# Patient Record
Sex: MALE | Race: WHITE | NOT HISPANIC OR LATINO
[De-identification: names, ages, dates, MRNs, and addresses within clinical notes are randomized per-mention and may not be internally consistent; named-entity substitution may affect disease eponyms.]

---

## 2019-09-20 ENCOUNTER — TRANSCRIPTION ENCOUNTER (OUTPATIENT)
Age: 69
End: 2019-09-20

## 2020-07-09 PROBLEM — Z00.00 ENCOUNTER FOR PREVENTIVE HEALTH EXAMINATION: Status: ACTIVE | Noted: 2020-07-09

## 2020-07-22 ENCOUNTER — APPOINTMENT (OUTPATIENT)
Dept: UROLOGY | Facility: CLINIC | Age: 70
End: 2020-07-22
Payer: MEDICARE

## 2020-07-22 ENCOUNTER — TRANSCRIPTION ENCOUNTER (OUTPATIENT)
Age: 70
End: 2020-07-22

## 2020-07-22 VITALS
TEMPERATURE: 97.8 F | HEIGHT: 70 IN | WEIGHT: 205 LBS | RESPIRATION RATE: 16 BRPM | HEART RATE: 74 BPM | SYSTOLIC BLOOD PRESSURE: 119 MMHG | BODY MASS INDEX: 29.35 KG/M2 | OXYGEN SATURATION: 97 % | DIASTOLIC BLOOD PRESSURE: 83 MMHG

## 2020-07-22 DIAGNOSIS — Z78.9 OTHER SPECIFIED HEALTH STATUS: ICD-10-CM

## 2020-07-22 DIAGNOSIS — Z80.9 FAMILY HISTORY OF MALIGNANT NEOPLASM, UNSPECIFIED: ICD-10-CM

## 2020-07-22 PROCEDURE — 99204 OFFICE O/P NEW MOD 45 MIN: CPT

## 2020-07-22 NOTE — ASSESSMENT
[FreeTextEntry1] : recommend T and PSA to see where he is at\par to get in AM tomorrow\par discussed other meds for osteopenia instead of T injections\par discussed Prosthesis for ED as T replacement not effective\par will refer to Colleague : Dr Lovelace who he was slated to see initially but due to his absence today , was seen by me.

## 2020-07-22 NOTE — HISTORY OF PRESENT ILLNESS
[FreeTextEntry1] : patient hx reviewed along with medical records supplied\par patient retired from [employer] and shortly thereafter at 63- was dx with pca \par states psa was 5+ and snow near 8 \par [place] [facility] [place] but told not good candidate so went for RRP (2016)  but as there was residual disease -- he had post op RT (2017) \par he states he never had ADT and after above no erections. however due to lack of enegy and a bone scan that showed OSTEOPENIA, he was begun on T replacement for low T, 2 years after above ( march 2019[place] [facility] [place]par after 3 injections with good rise in T and stable PSA- he elected to continue injections with Dr Currie at [facility] ( unable to give himself) and states at about 60 mg every Thursday is T is about 500.\par He is here to continue care due to an error he states was made by Dr Currie who continued to give him 80 mg with a T rise over 1000. he is due for an injetion this week .states his energy and muscle mass is much improved but still has no erections.\par denies any LUTS now after Dr Currie incised what appears to be a BNC due to slow flow/incomplete emptying in the past.,Now with AUA sx score of 3 ,  no hematuria or fever or N/V, no bowel issues

## 2020-07-22 NOTE — PHYSICAL EXAM
[General Appearance - Well Developed] : well developed [General Appearance - Well Nourished] : well nourished [General Appearance - In No Acute Distress] : no acute distress [Well Groomed] : well groomed [Normal Appearance] : normal appearance [Edema] : no peripheral edema [Respiration, Rhythm And Depth] : normal respiratory rhythm and effort [Abdomen Soft] : soft [Exaggerated Use Of Accessory Muscles For Inspiration] : no accessory muscle use [Abdomen Tenderness] : non-tender [Abdomen Mass (___ Cm)] : no abdominal mass palpated [Abdomen Hernia] : no hernia was discovered [Costovertebral Angle Tenderness] : no ~M costovertebral angle tenderness [Normal Station and Gait] : the gait and station were normal for the patient's age [] : no rash [No Focal Deficits] : no focal deficits [Oriented To Time, Place, And Person] : oriented to person, place, and time [Affect] : the affect was normal [Mood] : the mood was normal [Not Anxious] : not anxious [Cervical Lymph Nodes Enlarged Anterior Bilaterally] : anterior cervical [Cervical Lymph Nodes Enlarged Posterior Bilaterally] : posterior cervical [Supraclavicular Lymph Nodes Enlarged Bilaterally] : supraclavicular [FreeTextEntry1] : bladder not full

## 2020-07-22 NOTE — REVIEW OF SYSTEMS
[Feeling Tired] : feeling tired [Loss of interest] : loss of interest in sexual activity [Blood in urine that you can see] : blood visible in urine [Poor quality erections] : Poor quality erections [Told you have blood in urine on a urine test] : told blood was present in a urine test [Joint Pain] : joint pain [Wake up at night to urinate  How many times?  ___] : wakes up to urinate [unfilled] times during the night [Anxiety] : anxiety [Depression] : depression [Negative] : Heme/Lymph [see HPI] : see HPI [No erections] : no erections

## 2020-08-05 ENCOUNTER — APPOINTMENT (OUTPATIENT)
Dept: UROLOGY | Facility: CLINIC | Age: 70
End: 2020-08-05
Payer: MEDICARE

## 2020-08-05 VITALS
TEMPERATURE: 97.8 F | HEIGHT: 70 IN | OXYGEN SATURATION: 96 % | RESPIRATION RATE: 15 BRPM | DIASTOLIC BLOOD PRESSURE: 87 MMHG | BODY MASS INDEX: 29.35 KG/M2 | HEART RATE: 97 BPM | WEIGHT: 205 LBS | SYSTOLIC BLOOD PRESSURE: 167 MMHG

## 2020-08-05 DIAGNOSIS — Z86.39 PERSONAL HISTORY OF OTHER ENDOCRINE, NUTRITIONAL AND METABOLIC DISEASE: ICD-10-CM

## 2020-08-05 DIAGNOSIS — Z86.59 PERSONAL HISTORY OF OTHER MENTAL AND BEHAVIORAL DISORDERS: ICD-10-CM

## 2020-08-05 PROCEDURE — 99214 OFFICE O/P EST MOD 30 MIN: CPT

## 2020-08-05 RX ORDER — ROSUVASTATIN CALCIUM 20 MG/1
20 TABLET, FILM COATED ORAL
Refills: 0 | Status: ACTIVE | COMMUNITY

## 2020-08-05 NOTE — HISTORY OF PRESENT ILLNESS
[FreeTextEntry1] : He is a 69-year-old man who is seen today for prostate cancer, urethral stricture, low testosterone and erectile dysfunction. In 2016, he underwent robotic radical prostatectomy at [facility] [place] by [place]. Rosen. He was told that margins were positive and he underwent radiation in March 2017. He had PSA 5 and [place] 8 prostate cancer. He did not receive any androgen deprivation therapy. Bone density in 2018 showed osteopenia. In 2019, he was started on testosterone injection therapy. Patient says that his baseline testosterone was 190 and he was feeling tired and lethargic. Erections have not responded to oral medication therapy so far. In August 2019 he underwent dilation of tight meatal and fossa navicularis strictures. he is using 0.4-0.6 mL of testosterone 200 mg/mL injections once a week. In July 2020, testosterone was 353 and PSA 0.3. Residual urine today was minimal.

## 2020-08-05 NOTE — PHYSICAL EXAM
[General Appearance - Well Nourished] : well nourished [General Appearance - Well Developed] : well developed [Normal Appearance] : normal appearance [Well Groomed] : well groomed [General Appearance - In No Acute Distress] : no acute distress [Abdomen Tenderness] : non-tender [Costovertebral Angle Tenderness] : no ~M costovertebral angle tenderness [Abdomen Soft] : soft [Urethral Meatus] : meatus normal [Penis Abnormality] : normal circumcised penis [Epididymis] : the epididymides were normal [Testes Tenderness] : no tenderness of the testes [Urinary Bladder Findings] : the bladder was normal on palpation [Scrotum] : the scrotum was normal [Testes Mass (___cm)] : there were no testicular masses [No Prostate Nodules] : no prostate nodules [Respiration, Rhythm And Depth] : normal respiratory rhythm and effort [] : no respiratory distress [Prostate Absent] : was absent [Oriented To Time, Place, And Person] : oriented to person, place, and time [Exaggerated Use Of Accessory Muscles For Inspiration] : no accessory muscle use [Normal Station and Gait] : the gait and station were normal for the patient's age [Inguinal Lymph Nodes Enlarged Bilaterally] : inguinal

## 2020-08-05 NOTE — ASSESSMENT
[FreeTextEntry1] : The controversies regarding using testosterone replacement therapy in view of prostate cancer were reviewed. If he is to continue with testosterone injections, he should monitor PSA level at least every 4 months. He agrees. Urinary symptoms seem to be relatively stable at this time and there is no evidence of meatal stricture on physical examination. Residual urine volume is minimal. Other treatment options for erectile dysfunction such as injection therapy and penile prosthesis were discussed.

## 2021-01-31 LAB
PSA SERPL-MCNC: 0.58 NG/ML
TESTOST SERPL-MCNC: 626 NG/DL

## 2021-02-02 ENCOUNTER — TRANSCRIPTION ENCOUNTER (OUTPATIENT)
Age: 71
End: 2021-02-02

## 2021-02-09 ENCOUNTER — APPOINTMENT (OUTPATIENT)
Dept: UROLOGY | Facility: CLINIC | Age: 71
End: 2021-02-09
Payer: MEDICARE

## 2021-02-09 VITALS
WEIGHT: 205 LBS | DIASTOLIC BLOOD PRESSURE: 98 MMHG | SYSTOLIC BLOOD PRESSURE: 156 MMHG | BODY MASS INDEX: 29.35 KG/M2 | RESPIRATION RATE: 14 BRPM | HEIGHT: 70 IN | TEMPERATURE: 97.8 F | HEART RATE: 105 BPM | OXYGEN SATURATION: 95 %

## 2021-02-09 PROCEDURE — 99214 OFFICE O/P EST MOD 30 MIN: CPT

## 2021-02-09 PROCEDURE — 99072 ADDL SUPL MATRL&STAF TM PHE: CPT

## 2021-02-09 NOTE — PHYSICAL EXAM
[General Appearance - Well Developed] : well developed [General Appearance - Well Nourished] : well nourished [Normal Appearance] : normal appearance [Well Groomed] : well groomed [General Appearance - In No Acute Distress] : no acute distress [Abdomen Soft] : soft [Abdomen Tenderness] : non-tender [Costovertebral Angle Tenderness] : no ~M costovertebral angle tenderness [Urethral Meatus] : meatus normal [Penis Abnormality] : normal circumcised penis [Urinary Bladder Findings] : the bladder was normal on palpation [Scrotum] : the scrotum was normal [Epididymis] : the epididymides were normal [Testes Tenderness] : no tenderness of the testes [Testes Mass (___cm)] : there were no testicular masses [Prostate Absent] : was absent [FreeTextEntry1] : MASON done 2/2021.  [] : no respiratory distress [Respiration, Rhythm And Depth] : normal respiratory rhythm and effort [Exaggerated Use Of Accessory Muscles For Inspiration] : no accessory muscle use [Inguinal Lymph Nodes Enlarged Bilaterally] : inguinal

## 2021-02-09 NOTE — ASSESSMENT
[FreeTextEntry1] : Low testosterone: Testosterone level has responded appropriately with injections as indicated above.  He will continue for now.  Prostate cancer: Increase in PSA level was noted compared to last July.  We will continue to monitor in a few months and then we will decide on continuation of testosterone therapy.  Erectile dysfunction: He is responding to oral medication therapy for erectile dysfunction at this time.  He can follow-up in about 6 months. \par \par  Today´s visit included review of previous radiologic and laboratory tests and patient´s history, examination and evaluation, counseling and education of patient (family or caregiver if present), ordering medication, laboratory and radiological studies, documentation of information in medical records and independently interpreting results and care coordination, if required. Total time spent was 30 minutes.

## 2021-02-09 NOTE — HISTORY OF PRESENT ILLNESS
[FreeTextEntry1] : He is a 70-year-old man who is seen today for prostate cancer, urethral stricture, low testosterone and erectile dysfunction.  Nocturia is twice.  He has minimal or no stress incontinence.  Also he is responding to Viagra, Cialis or Stendra for erectile dysfunction.  He is injecting testosterone on a weekly basis 0.6 cc of 200 mg/mL.  It improves his energy and erections.  In January 2021, PSA level was 0.58 and testosterone was 626.\par Previous notes: In 2016, he underwent robotic radical prostate[place] at [facility] [place] Rosen. He was told that margins were positive and he underwent radiation in March 2017. He had PSA 5 and Neelima 8 prostate cancer. He did not receive any androgen deprivation therapy. Bone density in 2018 showed osteopenia. In 2019, he was started on testosterone injection therapy. Patient says that his baseline testosterone was 190 and he was feeling tired and lethargic. In August 2019 he underwent dilation of tight meatal and fossa navicularis strictures. he is using 0.4-0.6 mL of testosterone 200 mg/mL injections once a week. In July 2020, testosterone was 353 and PSA 0.3. Residual urine was minimal.

## 2021-02-25 ENCOUNTER — TRANSCRIPTION ENCOUNTER (OUTPATIENT)
Age: 71
End: 2021-02-25

## 2021-03-01 ENCOUNTER — TRANSCRIPTION ENCOUNTER (OUTPATIENT)
Age: 71
End: 2021-03-01

## 2021-03-16 ENCOUNTER — TRANSCRIPTION ENCOUNTER (OUTPATIENT)
Age: 71
End: 2021-03-16

## 2021-08-16 ENCOUNTER — APPOINTMENT (OUTPATIENT)
Dept: UROLOGY | Facility: CLINIC | Age: 71
End: 2021-08-16
Payer: MEDICARE

## 2021-08-16 VITALS
TEMPERATURE: 97.8 F | HEIGHT: 70 IN | RESPIRATION RATE: 15 BRPM | SYSTOLIC BLOOD PRESSURE: 157 MMHG | WEIGHT: 205 LBS | HEART RATE: 90 BPM | BODY MASS INDEX: 29.35 KG/M2 | DIASTOLIC BLOOD PRESSURE: 84 MMHG | OXYGEN SATURATION: 96 %

## 2021-08-16 LAB
PSA SERPL-MCNC: 0.9 NG/ML
TESTOST SERPL-MCNC: 1007 NG/DL

## 2021-08-16 PROCEDURE — 99214 OFFICE O/P EST MOD 30 MIN: CPT

## 2021-08-16 NOTE — PHYSICAL EXAM
[General Appearance - Well Developed] : well developed [General Appearance - Well Nourished] : well nourished [Normal Appearance] : normal appearance [Well Groomed] : well groomed [General Appearance - In No Acute Distress] : no acute distress [Abdomen Soft] : soft [Abdomen Tenderness] : non-tender [Costovertebral Angle Tenderness] : no ~M costovertebral angle tenderness [Urethral Meatus] : meatus normal [Penis Abnormality] : normal circumcised penis [Urinary Bladder Findings] : the bladder was normal on palpation [Scrotum] : the scrotum was normal [Epididymis] : the epididymides were normal [Testes Tenderness] : no tenderness of the testes [Testes Mass (___cm)] : there were no testicular masses [Prostate Absent] : was absent [FreeTextEntry1] : MASON done 2/2021.  [] : no respiratory distress [Respiration, Rhythm And Depth] : normal respiratory rhythm and effort [Exaggerated Use Of Accessory Muscles For Inspiration] : no accessory muscle use

## 2021-08-16 NOTE — HISTORY OF PRESENT ILLNESS
[FreeTextEntry1] : He is a 70-year-old man who is seen today for prostate cancer, urethral stricture, low testosterone and erectile dysfunction.  Nocturia is once or twice.  He has tried several oral medication therapies and quality of erections are not satisfactory unless he uses a vacuum erection device.  In August 2021, testosterone level was 1007 and the PSA level increased to 0.9.  There is no weight loss or bone pain.  He does not have stress incontinence.\par \par Previous notes: He is responding to Viagra, Cialis or Stendra for erectile dysfunction.  He is injecting testosterone on a weekly basis 0.6 cc of 200 mg/mL.  It improves his energy and erections.  In January 2021, PSA level was 0.58 and testosterone was 626.\par \par Previous urologic history: In 2016, he underwent robotic radical prostate[place] at [facility] [place] Rosen. He was told that margins were positive and he underwent radiation in March 2017. He had PSA 5 and Neelima 8 prostate cancer. He did not receive any androgen deprivation therapy. Bone density in 2018 showed osteopenia. In 2019, he was started on testosterone injection therapy. Patient says that his baseline testosterone was 190 and he was feeling tired and lethargic. In August 2019 he underwent dilation of tight meatal and fossa navicularis strictures. he is using 0.4-0.6 mL of testosterone 200 mg/mL injections once a week. In July 2020, testosterone was 353 and PSA 0.3. Residual urine was minimal.

## 2021-08-16 NOTE — ASSESSMENT
[FreeTextEntry1] : Increase in PSA level was discussed.  Potential for recurrence of prostate cancer with PSA activity was discussed.  He is very concerned about coming off of testosterone replacement therapy.  At least he agrees for now to decrease testosterone injection to 40 mg once a week and have a PSA and testosterone level repeated in 3 months.  Risk of metastasis was discussed.  If PSA level continues to increase, then may consider Axumin PET scan for metastatic work-up.

## 2021-08-17 ENCOUNTER — TRANSCRIPTION ENCOUNTER (OUTPATIENT)
Age: 71
End: 2021-08-17

## 2021-08-19 ENCOUNTER — TRANSCRIPTION ENCOUNTER (OUTPATIENT)
Age: 71
End: 2021-08-19

## 2021-08-22 ENCOUNTER — TRANSCRIPTION ENCOUNTER (OUTPATIENT)
Age: 71
End: 2021-08-22

## 2021-08-27 ENCOUNTER — APPOINTMENT (OUTPATIENT)
Dept: UROLOGY | Facility: CLINIC | Age: 71
End: 2021-08-27

## 2021-09-28 ENCOUNTER — APPOINTMENT (OUTPATIENT)
Dept: UROLOGY | Facility: CLINIC | Age: 71
End: 2021-09-28

## 2021-10-12 ENCOUNTER — APPOINTMENT (OUTPATIENT)
Dept: UROLOGY | Facility: CLINIC | Age: 71
End: 2021-10-12

## 2021-11-10 LAB — PSA SERPL-MCNC: 0.62 NG/ML

## 2021-11-11 LAB — TESTOST SERPL-MCNC: 370 NG/DL

## 2021-11-19 ENCOUNTER — APPOINTMENT (OUTPATIENT)
Dept: UROLOGY | Facility: CLINIC | Age: 71
End: 2021-11-19

## 2021-11-19 VITALS
DIASTOLIC BLOOD PRESSURE: 96 MMHG | BODY MASS INDEX: 28.35 KG/M2 | SYSTOLIC BLOOD PRESSURE: 147 MMHG | HEART RATE: 93 BPM | OXYGEN SATURATION: 98 % | WEIGHT: 198 LBS | TEMPERATURE: 97.1 F | HEIGHT: 70 IN

## 2021-11-22 ENCOUNTER — TRANSCRIPTION ENCOUNTER (OUTPATIENT)
Age: 71
End: 2021-11-22

## 2021-11-26 ENCOUNTER — TRANSCRIPTION ENCOUNTER (OUTPATIENT)
Age: 71
End: 2021-11-26

## 2021-12-03 ENCOUNTER — LABORATORY RESULT (OUTPATIENT)
Age: 71
End: 2021-12-03

## 2021-12-03 ENCOUNTER — APPOINTMENT (OUTPATIENT)
Dept: UROLOGY | Facility: CLINIC | Age: 71
End: 2021-12-03
Payer: MEDICARE

## 2021-12-03 VITALS — HEART RATE: 71 BPM | TEMPERATURE: 97.3 F | DIASTOLIC BLOOD PRESSURE: 85 MMHG | SYSTOLIC BLOOD PRESSURE: 140 MMHG

## 2021-12-03 PROCEDURE — 99214 OFFICE O/P EST MOD 30 MIN: CPT

## 2021-12-03 NOTE — HISTORY OF PRESENT ILLNESS
[FreeTextEntry1] : 71 year [place] \par  x 34 years\par one son\par diagnosed with prostate cancer 2015\par RALP Dr Deon Tillman\par postoperative radiation therapy\par currently complaining of erectile dysfunction\par currently being treated by Dr Tee Bustillos at [facility] [place]\par patient was accused of inappropriate behaviour and dismissed patient from practice\par Patient was seen by Dr Lovelace and wants to change care provider.\par He is unhappy regarding swings in testosterone levels\par Currently he is taking testosterone cypionate 40 mg IM weekly and tadalafil 20 mg

## 2021-12-03 NOTE — ASSESSMENT
[FreeTextEntry1] : Mr. Brayden Barahona is a 71-year-old retired [employer] [place] [employer] worker.  He is .  He has 1 child.  He has a past history of having prostate cancer.  This was reported as a Neelima's 8 cancer with positive margins.  Surgery was carried out by Dr. Deon Rosen.  He subsequently underwent radiation therapy.  He has been treated by Dr. Tee Bustillos for erectile dysfunction and hypogonadism.  He has been placed on testosterone therapy.  Dr. Bustillos dismissed the patient from his practice because of reported "inappropriate behavior".  The patient denies this behavior.\par \par He has been maintained on testosterone therapy by Dr. Ramirez.\par He now presents with concern regarding his erectile function.  However patient reports that he gets an excellent erection with tadalafil or sildenafil.  He previously underwent penile duplex ultrasound evaluation by Dr. Bustillos this result is not available however he did get an excellent erection from that.  Additionally he does respond to oral medications.  He is concerned about his blood flow.  We discussed the fact that the fact that he responds to the oral medications indicates that he does have normal blood flow to the penis.  His erectile dysfunction is clearly due to the effects of the surgery and or radiation on the neurotransmitter necessary for erections.  The BRAYDEN BARAHONA  expressed fully understanding of the information provided, the consequences and the management.\par Discussed PD5-I possible side effects, onset of action, duration of action, and food interactions.  Discussed behavioral strategies to optimize efficacy of medication.  \par DIscussed the potential advantages and disadvantages as well as side effect profiles of each.\par Warned re priapism and need for intervention to prevent permanent penile injury,\par \par \par \par We discussed the fact that testosterone therapy after prostate cancer is controversial. \par Recenlty 40 mg IM q week (last shot)  We discussed the fact that he does have an elevated PSA.  This is suggestive of recurrence of the disease.  It is my recommendation that he stop taking the testosterone therapy at this point.  Additionally I think he needs to undergo a PSA and a scan to determine evidence of recurrent disease as well as location and possible therapy required.\par The BRAYDEN BARAHONA  expressed fully understanding of the information provided, the consequences and the management.\par We discussed potential side effects of T cessation\par \par Plan:\par 1. T level ( last injection 3 days ago)\par 2. PSA\par 3 PSMA scan\par 4. tadalafil 20 mg\par 4. followup\par

## 2021-12-03 NOTE — PHYSICAL EXAM
[Abdomen Soft] : soft [Abdomen Tenderness] : non-tender [Costovertebral Angle Tenderness] : no ~M costovertebral angle tenderness [Urethral Meatus] : meatus normal [Urinary Bladder Findings] : the bladder was normal on palpation [Scrotum] : the scrotum was normal [Testes Mass (___cm)] : there were no testicular masses [FreeTextEntry1] : 10 cm; absent prostate ; irregular fossa

## 2021-12-05 ENCOUNTER — TRANSCRIPTION ENCOUNTER (OUTPATIENT)
Age: 71
End: 2021-12-05

## 2021-12-29 ENCOUNTER — NON-APPOINTMENT (OUTPATIENT)
Age: 71
End: 2021-12-29

## 2022-01-04 ENCOUNTER — NON-APPOINTMENT (OUTPATIENT)
Age: 72
End: 2022-01-04

## 2022-02-02 ENCOUNTER — APPOINTMENT (OUTPATIENT)
Dept: UROLOGY | Facility: CLINIC | Age: 72
End: 2022-02-02

## 2022-02-08 ENCOUNTER — APPOINTMENT (OUTPATIENT)
Dept: NUCLEAR MEDICINE | Facility: IMAGING CENTER | Age: 72
End: 2022-02-08
Payer: COMMERCIAL

## 2022-02-08 ENCOUNTER — OUTPATIENT (OUTPATIENT)
Dept: OUTPATIENT SERVICES | Facility: HOSPITAL | Age: 72
LOS: 1 days | End: 2022-02-08
Payer: COMMERCIAL

## 2022-02-08 DIAGNOSIS — C61 MALIGNANT NEOPLASM OF PROSTATE: ICD-10-CM

## 2022-02-08 PROCEDURE — 78816 PET IMAGE W/CT FULL BODY: CPT | Mod: 26

## 2022-02-08 PROCEDURE — 78816 PET IMAGE W/CT FULL BODY: CPT

## 2022-02-08 PROCEDURE — A9595: CPT

## 2022-02-15 ENCOUNTER — NON-APPOINTMENT (OUTPATIENT)
Age: 72
End: 2022-02-15

## 2022-03-08 ENCOUNTER — APPOINTMENT (OUTPATIENT)
Dept: UROLOGY | Facility: CLINIC | Age: 72
End: 2022-03-08
Payer: MEDICARE

## 2022-03-08 VITALS
RESPIRATION RATE: 17 BRPM | HEART RATE: 90 BPM | SYSTOLIC BLOOD PRESSURE: 148 MMHG | TEMPERATURE: 97.6 F | HEIGHT: 70 IN | DIASTOLIC BLOOD PRESSURE: 76 MMHG | BODY MASS INDEX: 28.35 KG/M2 | WEIGHT: 198 LBS

## 2022-03-08 PROCEDURE — 99215 OFFICE O/P EST HI 40 MIN: CPT

## 2022-03-08 RX ORDER — CEPHALEXIN 500 MG/1
500 CAPSULE ORAL
Qty: 14 | Refills: 0 | Status: COMPLETED | COMMUNITY
Start: 2021-12-30

## 2022-03-08 NOTE — ASSESSMENT
[FreeTextEntry1] : Rising PSA post prostate cancer treatment: Repeat PSA and serum testosterone today.\par Hypergonadism: We will have repeat serum testosterone today. Discussed the controversy regarding testosterone supplementation in patients who have potentially active prostate cancer. We will hold off on testosterone supplementation at this time.\par \par Bladder neck contracture: Urinary function appears to be stable. Urinalysis and cytology were sent today.\par \par We will call with results and follow-up plan.

## 2022-03-08 NOTE — HISTORY OF PRESENT ILLNESS
[FreeTextEntry1] : Referred for further evaluation regarding rising PSA.\par Patient has a prior history of prostate cancer diagnosed in 2015. He underwent a robotic assisted laparoscopic radical prostatectomy. By the patient report he was noted to have Neelima 8 prostate cancer. Also by the patient report he did have positive lymph node invasion. His PSA post prostatectomy was detectable and he eventually underwent salvage radiation therapy. This was not accompanied by ADT.\par \par At some time following his diagnosis, he was also started on testosterone supplementation for low testosterone and erectile dysfunction.\par \par His PSA in January 2021 was 0.58 ng/mL. There was a subsequent rise to 0.9 ng/mL in August 2021. The most recent PSA was 0.62 in December 2021. He underwent a PSMA PET/CT scan February 2022: No evidence of recurrent or metastatic disease.\par \par Following his rising PSA, his testosterone supplementation was discontinued in December. He reports that he continues to have normal energy and stamina. He is able to achieve full erections with the use of tadalafil 20 mg.\par \par From a urinary standpoint he does report reasonable urinary flow and no significant stress incontinence. At the end of December he did have an episode of difficulty with urination. He was seen in outside hospital and underwent a urethral dilation for presumed bladder neck contracture. Following the dilation his urinary function is improved and has remained stable.\par \par

## 2022-03-09 ENCOUNTER — NON-APPOINTMENT (OUTPATIENT)
Age: 72
End: 2022-03-09

## 2022-03-10 VITALS — SYSTOLIC BLOOD PRESSURE: 110 MMHG | HEART RATE: 88 BPM | DIASTOLIC BLOOD PRESSURE: 70 MMHG | RESPIRATION RATE: 18 BRPM

## 2022-03-11 ENCOUNTER — NON-APPOINTMENT (OUTPATIENT)
Age: 72
End: 2022-03-11

## 2022-03-11 ENCOUNTER — APPOINTMENT (OUTPATIENT)
Dept: UROLOGY | Facility: CLINIC | Age: 72
End: 2022-03-11

## 2022-03-15 DIAGNOSIS — C61 MALIGNANT NEOPLASM OF PROSTATE: ICD-10-CM

## 2022-03-15 LAB
APPEARANCE: CLEAR
BACTERIA: NEGATIVE
BILIRUBIN URINE: NEGATIVE
BLOOD URINE: ABNORMAL
COLOR: YELLOW
GLUCOSE QUALITATIVE U: NEGATIVE
HYALINE CASTS: 0 /LPF
KETONES URINE: NEGATIVE
LEUKOCYTE ESTERASE URINE: ABNORMAL
MICROSCOPIC-UA: NORMAL
NITRITE URINE: NEGATIVE
PH URINE: 6
PROTEIN URINE: NORMAL
PSA SERPL-MCNC: <0.01 NG/ML
RED BLOOD CELLS URINE: 30 /HPF
SPECIFIC GRAVITY URINE: 1.02
SQUAMOUS EPITHELIAL CELLS: 2 /HPF
TESTOST SERPL-MCNC: 60.4 NG/DL
URINE CYTOLOGY: NORMAL
UROBILINOGEN URINE: NORMAL
WHITE BLOOD CELLS URINE: 52 /HPF

## 2022-03-23 ENCOUNTER — APPOINTMENT (OUTPATIENT)
Dept: UROLOGY | Facility: CLINIC | Age: 72
End: 2022-03-23
Payer: MEDICARE

## 2022-03-23 VITALS
OXYGEN SATURATION: 98 % | TEMPERATURE: 98 F | HEART RATE: 81 BPM | SYSTOLIC BLOOD PRESSURE: 136 MMHG | DIASTOLIC BLOOD PRESSURE: 86 MMHG

## 2022-03-23 DIAGNOSIS — R97.21 RISING PSA FOLLOWING TREATMENT FOR MALIGNANT NEOPLASM OF PROSTATE: ICD-10-CM

## 2022-03-23 DIAGNOSIS — N32.0 BLADDER-NECK OBSTRUCTION: ICD-10-CM

## 2022-03-23 DIAGNOSIS — N52.9 MALE ERECTILE DYSFUNCTION, UNSPECIFIED: ICD-10-CM

## 2022-03-23 PROCEDURE — 99214 OFFICE O/P EST MOD 30 MIN: CPT

## 2022-03-23 NOTE — HISTORY OF PRESENT ILLNESS
[FreeTextEntry1] : 71 year [place] \par  x 34 years\par one son\par diagnosed with prostate cancer 2015\par RALP Dr Deon Tillman\par postoperative radiation therapy\par currently complaining of erectile dysfunction\par currently being treated by Dr Tee Bustillos at [facility] [place]\par patient was accused of inappropriate behaviour and dismissed patient from practice\par Patient was seen by Dr Lovelace and wants to change care provider.\par He is unhappy regarding swings in testosterone levels\par Currently he is taking testosterone cypionate 40 mg IM weekly and tadalafil 20 mg\par \par 3.23.2022\par patient returns re erectile dysfunction\par prostate cancer \par hypogonadism

## 2022-04-26 ENCOUNTER — APPOINTMENT (OUTPATIENT)
Dept: ENDOCRINOLOGY | Facility: CLINIC | Age: 72
End: 2022-04-26
Payer: MEDICARE

## 2022-04-26 VITALS
SYSTOLIC BLOOD PRESSURE: 150 MMHG | HEIGHT: 70 IN | OXYGEN SATURATION: 97 % | WEIGHT: 207 LBS | BODY MASS INDEX: 29.63 KG/M2 | TEMPERATURE: 98.6 F | HEART RATE: 103 BPM | DIASTOLIC BLOOD PRESSURE: 80 MMHG

## 2022-04-26 DIAGNOSIS — E29.1 TESTICULAR HYPOFUNCTION: ICD-10-CM

## 2022-04-26 PROCEDURE — 99204 OFFICE O/P NEW MOD 45 MIN: CPT

## 2022-04-26 NOTE — ASSESSMENT
[FreeTextEntry1] : Hypogonadism. Prostate cancer (Neelima 8). \par We discussed in details that testosterone replacement in males with a history of prostate cancer is controversial due to the androgen sensitivity of prostate cancer cells, and at least theoretical concerns for stimulating disease progression.\par At this point I would not be feeling comfortable providing a TRT to this patient, and I've advised to continue his follow ups with the urologists. Patient understands this  and is appreciative of the explanation. options for ED reviewed including PDE inhibitors. He'll review this with his urologist as well.\par He does not need to see me on a routine bases, however patient expressed a  desire to return in about 6 months to update me on his progress.

## 2022-04-26 NOTE — HISTORY OF PRESENT ILLNESS
[FreeTextEntry1] : 71 year  Male referred for low testosterone levels. \par Mr Barahona was diagnosed with a prostate cancer in 06/2016. He underwent a robotic assisted laparoscopic radical prostatectomy (with positive LN invasion), followed by a salvage radiation therapy. Per patient he [place] [facility] [place] but it was discontinued in 12/21 due to rising PSA. He's been seen by several urologists since then. He does c/o worsening erections and decreased libido post surgery. Spontaneous am erections are decreased . Shaving is well preserved. He tried viagra, cialis but was not tolerating them well (headaches, hot flashes).\par Denies visual disturbances or headaches, dizziness, nausea, vomiting, abdominal pain,  breast discharge, worsening in peripheral vision or excessive snoring.\par He underwent puberty at a normal rate compared with his peers. No history of learning disabilities or behavioral disorders. No history of insults to the brain or testes, including surgery, trauma, tumors, or radiation exposure. \par No history of diabetes, HIV, liver disease, or kidney disease. No history of medication use including anabolic steroids, glucocorticoids, chronic pain medications, or history of chemotherapy. His sense of smell is intact. \par Family history is negative for infertility problems and low testosterone.\par Currently he is in monogamous relationship with his wife.\par \par

## 2022-04-27 ENCOUNTER — TRANSCRIPTION ENCOUNTER (OUTPATIENT)
Age: 72
End: 2022-04-27

## 2022-05-22 ENCOUNTER — NON-APPOINTMENT (OUTPATIENT)
Age: 72
End: 2022-05-22

## 2022-05-23 ENCOUNTER — TRANSCRIPTION ENCOUNTER (OUTPATIENT)
Age: 72
End: 2022-05-23

## 2022-06-23 ENCOUNTER — APPOINTMENT (OUTPATIENT)
Dept: UROLOGY | Facility: CLINIC | Age: 72
End: 2022-06-23

## 2022-10-26 ENCOUNTER — APPOINTMENT (OUTPATIENT)
Dept: ENDOCRINOLOGY | Facility: CLINIC | Age: 72
End: 2022-10-26

## 2024-01-11 ENCOUNTER — NON-APPOINTMENT (OUTPATIENT)
Age: 74
End: 2024-01-11

## 2024-01-11 ENCOUNTER — APPOINTMENT (OUTPATIENT)
Dept: ELECTROPHYSIOLOGY | Facility: CLINIC | Age: 74
End: 2024-01-11
Payer: MEDICARE

## 2024-01-11 VITALS
OXYGEN SATURATION: 98 % | HEIGHT: 70 IN | WEIGHT: 196 LBS | HEART RATE: 58 BPM | BODY MASS INDEX: 28.06 KG/M2 | SYSTOLIC BLOOD PRESSURE: 162 MMHG | DIASTOLIC BLOOD PRESSURE: 78 MMHG

## 2024-01-11 DIAGNOSIS — I44.2 ATRIOVENTRICULAR BLOCK, COMPLETE: ICD-10-CM

## 2024-01-11 PROCEDURE — 93000 ELECTROCARDIOGRAM COMPLETE: CPT

## 2024-01-11 PROCEDURE — 99205 OFFICE O/P NEW HI 60 MIN: CPT | Mod: 25

## 2024-01-11 RX ORDER — TADALAFIL 20 MG/1
20 TABLET ORAL
Qty: 18 | Refills: 0 | Status: DISCONTINUED | COMMUNITY
Start: 2022-01-18 | End: 2024-01-11

## 2024-01-11 RX ORDER — BUPROPION HYDROCHLORIDE 300 MG/1
300 TABLET, EXTENDED RELEASE ORAL DAILY
Refills: 0 | Status: ACTIVE | COMMUNITY

## 2024-01-11 RX ORDER — TADALAFIL 10 MG/1
10 TABLET, FILM COATED ORAL
Qty: 18 | Refills: 0 | Status: DISCONTINUED | COMMUNITY
Start: 2021-07-08 | End: 2024-01-11

## 2024-01-11 RX ORDER — AVANAFIL 200 MG/1
200 TABLET ORAL
Refills: 0 | Status: DISCONTINUED | COMMUNITY
End: 2024-01-11

## 2024-01-11 RX ORDER — SILDENAFIL 50 MG/1
50 TABLET ORAL
Qty: 18 | Refills: 0 | Status: DISCONTINUED | COMMUNITY
Start: 2021-11-04 | End: 2024-01-11

## 2024-01-11 RX ORDER — MUPIROCIN 20 MG/G
2 OINTMENT TOPICAL
Qty: 22 | Refills: 0 | Status: DISCONTINUED | COMMUNITY
Start: 2021-06-10 | End: 2024-01-11

## 2024-01-11 RX ORDER — TADALAFIL 5 MG/1
5 TABLET ORAL
Qty: 30 | Refills: 0 | Status: DISCONTINUED | COMMUNITY
Start: 2021-02-16 | End: 2024-01-11

## 2024-01-11 RX ORDER — TESTOSTERONE CYPIONATE 200 MG/ML
200 INJECTION, SOLUTION INTRAMUSCULAR
Qty: 10 | Refills: 0 | Status: DISCONTINUED | COMMUNITY
End: 2024-01-11

## 2024-01-26 ENCOUNTER — APPOINTMENT (OUTPATIENT)
Dept: CT IMAGING | Facility: CLINIC | Age: 74
End: 2024-01-26

## 2024-02-01 ENCOUNTER — APPOINTMENT (OUTPATIENT)
Dept: MRI IMAGING | Facility: CLINIC | Age: 74
End: 2024-02-01

## 2024-02-09 ENCOUNTER — OUTPATIENT (OUTPATIENT)
Dept: OUTPATIENT SERVICES | Facility: HOSPITAL | Age: 74
LOS: 1 days | End: 2024-02-09
Payer: MEDICARE

## 2024-02-09 ENCOUNTER — TRANSCRIPTION ENCOUNTER (OUTPATIENT)
Age: 74
End: 2024-02-09

## 2024-02-09 VITALS
RESPIRATION RATE: 13 BRPM | SYSTOLIC BLOOD PRESSURE: 173 MMHG | OXYGEN SATURATION: 96 % | HEART RATE: 52 BPM | DIASTOLIC BLOOD PRESSURE: 77 MMHG

## 2024-02-09 VITALS
TEMPERATURE: 98 F | SYSTOLIC BLOOD PRESSURE: 157 MMHG | DIASTOLIC BLOOD PRESSURE: 76 MMHG | WEIGHT: 192.02 LBS | HEIGHT: 70 IN | RESPIRATION RATE: 14 BRPM | OXYGEN SATURATION: 97 % | HEART RATE: 57 BPM

## 2024-02-09 DIAGNOSIS — R93.1 ABNORMAL FINDINGS ON DIAGNOSTIC IMAGING OF HEART AND CORONARY CIRCULATION: ICD-10-CM

## 2024-02-09 DIAGNOSIS — Z98.890 OTHER SPECIFIED POSTPROCEDURAL STATES: Chronic | ICD-10-CM

## 2024-02-09 LAB
ANION GAP SERPL CALC-SCNC: 11 MMOL/L — SIGNIFICANT CHANGE UP (ref 5–17)
BUN SERPL-MCNC: 17 MG/DL — SIGNIFICANT CHANGE UP (ref 7–23)
CALCIUM SERPL-MCNC: 9.8 MG/DL — SIGNIFICANT CHANGE UP (ref 8.4–10.5)
CHLORIDE SERPL-SCNC: 100 MMOL/L — SIGNIFICANT CHANGE UP (ref 96–108)
CO2 SERPL-SCNC: 26 MMOL/L — SIGNIFICANT CHANGE UP (ref 22–31)
CREAT SERPL-MCNC: 1.05 MG/DL — SIGNIFICANT CHANGE UP (ref 0.5–1.3)
EGFR: 76 ML/MIN/1.73M2 — SIGNIFICANT CHANGE UP
GLUCOSE SERPL-MCNC: 97 MG/DL — SIGNIFICANT CHANGE UP (ref 70–99)
HCT VFR BLD CALC: 39 % — SIGNIFICANT CHANGE UP (ref 39–50)
HGB BLD-MCNC: 13.6 G/DL — SIGNIFICANT CHANGE UP (ref 13–17)
MCHC RBC-ENTMCNC: 31.3 PG — SIGNIFICANT CHANGE UP (ref 27–34)
MCHC RBC-ENTMCNC: 34.9 GM/DL — SIGNIFICANT CHANGE UP (ref 32–36)
MCV RBC AUTO: 89.9 FL — SIGNIFICANT CHANGE UP (ref 80–100)
NRBC # BLD: 0 /100 WBCS — SIGNIFICANT CHANGE UP (ref 0–0)
PLATELET # BLD AUTO: 235 K/UL — SIGNIFICANT CHANGE UP (ref 150–400)
POTASSIUM SERPL-MCNC: 4.1 MMOL/L — SIGNIFICANT CHANGE UP (ref 3.5–5.3)
POTASSIUM SERPL-SCNC: 4.1 MMOL/L — SIGNIFICANT CHANGE UP (ref 3.5–5.3)
RBC # BLD: 4.34 M/UL — SIGNIFICANT CHANGE UP (ref 4.2–5.8)
RBC # FLD: 12.9 % — SIGNIFICANT CHANGE UP (ref 10.3–14.5)
SODIUM SERPL-SCNC: 137 MMOL/L — SIGNIFICANT CHANGE UP (ref 135–145)
WBC # BLD: 4.43 K/UL — SIGNIFICANT CHANGE UP (ref 3.8–10.5)
WBC # FLD AUTO: 4.43 K/UL — SIGNIFICANT CHANGE UP (ref 3.8–10.5)

## 2024-02-09 PROCEDURE — C1887: CPT

## 2024-02-09 PROCEDURE — C1769: CPT

## 2024-02-09 PROCEDURE — 93005 ELECTROCARDIOGRAM TRACING: CPT

## 2024-02-09 PROCEDURE — 93454 CORONARY ARTERY ANGIO S&I: CPT | Mod: 26,59

## 2024-02-09 PROCEDURE — 36415 COLL VENOUS BLD VENIPUNCTURE: CPT

## 2024-02-09 PROCEDURE — C1760: CPT

## 2024-02-09 PROCEDURE — C1725: CPT

## 2024-02-09 PROCEDURE — C9600: CPT | Mod: LC

## 2024-02-09 PROCEDURE — 93454 CORONARY ARTERY ANGIO S&I: CPT | Mod: 59

## 2024-02-09 PROCEDURE — 85027 COMPLETE CBC AUTOMATED: CPT

## 2024-02-09 PROCEDURE — 92928 PRQ TCAT PLMT NTRAC ST 1 LES: CPT | Mod: LC

## 2024-02-09 PROCEDURE — C1894: CPT

## 2024-02-09 PROCEDURE — 80048 BASIC METABOLIC PNL TOTAL CA: CPT

## 2024-02-09 PROCEDURE — 93010 ELECTROCARDIOGRAM REPORT: CPT

## 2024-02-09 PROCEDURE — C1874: CPT

## 2024-02-09 PROCEDURE — 99152 MOD SED SAME PHYS/QHP 5/>YRS: CPT

## 2024-02-09 RX ORDER — BUPROPION HYDROCHLORIDE 150 MG/1
1 TABLET, EXTENDED RELEASE ORAL
Refills: 0 | DISCHARGE

## 2024-02-09 RX ORDER — CLOPIDOGREL BISULFATE 75 MG/1
1 TABLET, FILM COATED ORAL
Qty: 90 | Refills: 3
Start: 2024-02-09 | End: 2025-02-02

## 2024-02-09 RX ORDER — ROSUVASTATIN CALCIUM 5 MG/1
1 TABLET ORAL
Refills: 0 | DISCHARGE

## 2024-02-09 RX ORDER — ACETAMINOPHEN 500 MG
650 TABLET ORAL ONCE
Refills: 0 | Status: COMPLETED | OUTPATIENT
Start: 2024-02-09 | End: 2024-02-09

## 2024-02-09 RX ORDER — ASPIRIN/CALCIUM CARB/MAGNESIUM 324 MG
1 TABLET ORAL
Qty: 30 | Refills: 11
Start: 2024-02-09 | End: 2025-02-02

## 2024-02-09 RX ADMIN — Medication 30 MILLILITER(S): at 14:30

## 2024-02-09 RX ADMIN — Medication 650 MILLIGRAM(S): at 19:38

## 2024-02-09 NOTE — ASU DISCHARGE PLAN (ADULT/PEDIATRIC) - CARE PROVIDER_API CALL
Bebeto Kilpatrick  Cardiac Electrophysiology  [address] [address]  Phone: [phone]  Fax: [phone]  Follow Up Time:

## 2024-02-09 NOTE — H&P CARDIOLOGY - NEGATIVE NEUROLOGICAL SYMPTOMS
no transient paralysis/no weakness/no paresthesias/no generalized seizures/no focal seizures/no syncope/no tremors/no vertigo/no loss of sensation/no difficulty walking/no headache/no loss of consciousness/no confusion

## 2024-02-09 NOTE — H&P CARDIOLOGY - NSICDXPASTMEDICALHX_GEN_ALL_CORE_FT
PAST MEDICAL HISTORY:  CHB (complete heart block)     HLD (hyperlipidemia)     Moderate aortic stenosis     Prostate cancer

## 2024-02-09 NOTE — H&P CARDIOLOGY - HISTORY OF PRESENT ILLNESS
Cardiologist: Dr. Bebeto Kilpatrick  EP: Dr. Scot Matthews  Pharmacy: [facility] ([address], [facility] [place] [address])    74 y/o Male w/ PMHx of HLD, mild-moderate AS, Prostate cancer s/p prostate surgery initially referred to EP/cardiologist from PCP's office after EKG revealed CHB a/w fatigue. Patient reports he was feeling well until the past summer when he began experiencing a mod amount of fatigue. EKG 8/11/23 revealed SR w/ frequent APCs and EKG 12/12/23 revealed SR w/ CHB and junctional escape. Denies any tick bites. Pt subsequently underwent CCTA which was abnormal (results as below). In light of pt's risk factors, CCS class II anginal-equivalent symptoms and abnormal CCTA; pt referred to [facility] for recommended cardiac catheterization w/ possible intervention if clinically indicated. Denies headaches, changes in vision, dizziness, chest pain, palpitations, SOB/WHITT, abdominal pain, N/V/D, leg pain/edema, hematuria, BRBPR, melena or any other complaints. No implantable devices.    Review of studies:   TTE 8/31/22: no LA dilatation (MOLLY 17), mild LVH, LVEF 68%, severe mitral annular calcification, mild AS, calcified AV, mod AS, trace AI, aortic root is normal in size  Exercise stress test 12/19/23: 5 METS, exercise time 2min 59sec, poor exercise capacity, baseline EKG CHB w/ junctional rhythm, exercise terminated d/t hip pain  CCTA 1/30/24: multifocal bulky calcifed plaques in p/mLAD, pD1, dCx and pOM1 limit eval of lumen; in addition there is possible severe stenosis by calcified and soft plaques in pRCA; overall obstructive lesions cannot be completely excluded and correlation w/ perfusion imaging or catheter angio is advised; there are portions of coronary anatomy not well depicted d/t motiont artifact from irregular heart rhythm; extensive burden of coronary calcium w/ a score of 2506 (95th percentile for CAD), mild enlargement of aortic root meausring 4.4cm, AV calcifcations, mod mitral annular calcifications

## 2024-02-12 ENCOUNTER — TRANSCRIPTION ENCOUNTER (OUTPATIENT)
Age: 74
End: 2024-02-12

## 2024-03-03 NOTE — DISCUSSION/SUMMARY
[EKG obtained to assist in diagnosis and management of assessed problem(s)] : EKG obtained to assist in diagnosis and management of assessed problem(s) [FreeTextEntry1] : Brayden Barahona is a 73-year-old gentleman with mild-moderate aortic stenosis and complete heart block with an escape at 50s-60s bpm.    Given that the etiology of his complete heart block remains unclear (ie. no recent tick bites, etc), I will obtain a cardiac MRI to assess for structural heart disease and a Chest CTA.    He remains relatively mildly symptomatic of the complete heart block, which is likely due to his stable junctional escape rate of 50s-60s.  I advised him to present to the emergency room if he begins to experience lightheadedness, dizzy spells, or syncopal episodes.     He has a Class I indication for pacemaker implantation.  The risks, benefits, and alternatives of pacemaker implantation were discussed with the patient in full.  Risks including, but not limited to bleeding, infection, heart block, stroke, cardiac tamponade, pneumothorax, kidney failure, need for open heart surgery, and death were reviewed.  The patient expressed verbal understanding and agreed to move forward with the procedure.  I will arrange for a dual-chamber pacemaker implantation to be scheduled in 2-3 weeks in order to allow time for him to complete imaging studies (ie. CMR, Cardiac CTA).

## 2024-03-03 NOTE — PHYSICAL EXAM
[Well Developed] : well developed [Well Nourished] : well nourished [No Acute Distress] : no acute distress [Clear Lung Fields] : clear lung fields [Soft] : abdomen soft [Normal Gait] : normal gait [No Edema] : no edema [No Rash] : no rash [Moves all extremities] : moves all extremities [No Focal Deficits] : no focal deficits [Normal Speech] : normal speech [Alert and Oriented] : alert and oriented [Normal memory] : normal memory [de-identified] : Systolic murmur 2-3/6

## 2024-03-03 NOTE — REASON FOR VISIT
[Arrhythmia/ECG Abnorrmalities] : arrhythmia/ECG abnormalities [Spouse] : spouse [FreeTextEntry3] : Dr. Yoan Kilpatrick [phone] [FreeTextEntry1] : Previous cardiologist: Dr. Yoan Ng (retired 12/2023)

## 2024-03-03 NOTE — END OF VISIT
[FreeTextEntry3] : I personally performed the history and physical exam and formulated the medical decision making in this patient. I was responsible for more than 50% of the work of this encounter. [Time Spent: ___ minutes] : I have spent [unfilled] minutes of time on the encounter.

## 2024-03-03 NOTE — HISTORY OF PRESENT ILLNESS
[FreeTextEntry1] : Mr. Brayden Barahona is a 73-year-old male with a history of mild-moderate AS, HLD, and prostate cancer (s/p prostate surgery), who presents for an evaluation prompted by EKG findings of complete heart block with associated c/o fatigue.    Patient reports having been feeling well until this past summer when he began experiencing a moderate amount of fatigue.  He was reportedly last in sinus rhythm on EKG from 8/11/2023, with frequent APCs (obtained from chart notes from office visit with Dr. gN on that date; no EKG tracings available).  As he followed with Dr. Ng on an every 4 month basis, he returned on 12/12/2023, at which time his EKG revealed sinus rhythm (sinus rate of 72bpm) with complete heart block and a junctional escape at 50bpm.  EKG today also shows sinus rhythm (with sinus rates of ~80) with complete heart block, and a junctional escape of 59bpm.   Other than feeling fatigued, he denies any palpitations, dyspnea, lightheadedness/dizziness, near syncope, or syncope.  He does not smoke, drink alcohol, or partake in illicit drug use.  No family history of CAD or sudden cardiac death.  Denies any tick bites.    8/31/2022 TTE:  No LA dilatation (MOLLY: 17).  Mild LVH.  EF 68%.  Severe mitral annular calcification.  Mild MS.  Calcified aortic valve.  Moderate AS.  Trace AR.   Aortic root is normal in size.   12/19/2023 Exercise Stress Test  ([facility]): 5 METS.  Exercise time: 2 minutes, 59 seconds.   Performance: Poor exercise capacity for age and gender.  Baseline ECG: Complete heart block, junctional rhythm Baseline HR: 58bpm; Peak HR: 59bpm (40% of max PHR) Exercise terminated due to patient request due to hip pain.

## 2025-05-01 ENCOUNTER — NON-APPOINTMENT (OUTPATIENT)
Age: 75
End: 2025-05-01

## 2025-05-08 ENCOUNTER — NON-APPOINTMENT (OUTPATIENT)
Age: 75
End: 2025-05-08

## 2025-05-15 ENCOUNTER — NON-APPOINTMENT (OUTPATIENT)
Age: 75
End: 2025-05-15

## 2025-05-22 ENCOUNTER — NON-APPOINTMENT (OUTPATIENT)
Age: 75
End: 2025-05-22

## 2025-05-29 ENCOUNTER — NON-APPOINTMENT (OUTPATIENT)
Age: 75
End: 2025-05-29

## 2025-06-05 ENCOUNTER — NON-APPOINTMENT (OUTPATIENT)
Age: 75
End: 2025-06-05

## 2025-06-12 ENCOUNTER — NON-APPOINTMENT (OUTPATIENT)
Age: 75
End: 2025-06-12

## 2025-06-19 ENCOUNTER — NON-APPOINTMENT (OUTPATIENT)
Age: 75
End: 2025-06-19

## 2025-06-26 ENCOUNTER — NON-APPOINTMENT (OUTPATIENT)
Age: 75
End: 2025-06-26

## 2025-07-03 ENCOUNTER — NON-APPOINTMENT (OUTPATIENT)
Age: 75
End: 2025-07-03

## 2025-07-10 ENCOUNTER — NON-APPOINTMENT (OUTPATIENT)
Age: 75
End: 2025-07-10

## 2025-07-17 ENCOUNTER — NON-APPOINTMENT (OUTPATIENT)
Age: 75
End: 2025-07-17

## 2025-07-24 ENCOUNTER — NON-APPOINTMENT (OUTPATIENT)
Age: 75
End: 2025-07-24

## 2025-07-31 ENCOUNTER — NON-APPOINTMENT (OUTPATIENT)
Age: 75
End: 2025-07-31

## 2025-08-07 ENCOUNTER — NON-APPOINTMENT (OUTPATIENT)
Age: 75
End: 2025-08-07

## 2025-08-14 ENCOUNTER — NON-APPOINTMENT (OUTPATIENT)
Age: 75
End: 2025-08-14

## 2025-08-21 ENCOUNTER — NON-APPOINTMENT (OUTPATIENT)
Age: 75
End: 2025-08-21

## 2025-08-28 ENCOUNTER — NON-APPOINTMENT (OUTPATIENT)
Age: 75
End: 2025-08-28

## 2025-09-04 ENCOUNTER — NON-APPOINTMENT (OUTPATIENT)
Age: 75
End: 2025-09-04

## 2025-09-11 ENCOUNTER — NON-APPOINTMENT (OUTPATIENT)
Age: 75
End: 2025-09-11

## 2025-09-18 ENCOUNTER — NON-APPOINTMENT (OUTPATIENT)
Age: 75
End: 2025-09-18